# Patient Record
Sex: FEMALE | Race: WHITE | NOT HISPANIC OR LATINO | URBAN - NONMETROPOLITAN AREA
[De-identification: names, ages, dates, MRNs, and addresses within clinical notes are randomized per-mention and may not be internally consistent; named-entity substitution may affect disease eponyms.]

---

## 2017-10-24 PROBLEM — H52.223: Noted: 2017-10-24

## 2017-10-24 PROBLEM — H52.13: Noted: 2017-10-24

## 2019-04-03 ENCOUNTER — IMPORTED ENCOUNTER (OUTPATIENT)
Dept: URBAN - NONMETROPOLITAN AREA CLINIC 1 | Facility: CLINIC | Age: 39
End: 2019-04-03

## 2019-04-03 PROCEDURE — 92015 DETERMINE REFRACTIVE STATE: CPT

## 2019-04-03 PROCEDURE — 92310 CONTACT LENS FITTING OU: CPT

## 2019-04-03 PROCEDURE — 92014 COMPRE OPH EXAM EST PT 1/>: CPT

## 2019-04-03 NOTE — PATIENT DISCUSSION
Myopia/Astigmatism OUDiscussed refractive status in detail with patient. New glasses and CL Rx given today. Discussed proper care replacement and hygiene.

## 2022-04-15 ASSESSMENT — TONOMETRY
OS_IOP_MMHG: 18
OD_IOP_MMHG: 18

## 2022-04-15 ASSESSMENT — VISUAL ACUITY
OD_PH: 20/25
OS_SC: 20/25
OD_CC: 20/50
OD_SC: 20/20